# Patient Record
Sex: MALE | Race: BLACK OR AFRICAN AMERICAN | URBAN - METROPOLITAN AREA
[De-identification: names, ages, dates, MRNs, and addresses within clinical notes are randomized per-mention and may not be internally consistent; named-entity substitution may affect disease eponyms.]

---

## 2017-12-19 RX ORDER — DOXYCYCLINE 100 MG/1
100 TABLET ORAL 2 TIMES DAILY
Qty: 20 TAB | Refills: 0 | Status: SHIPPED | OUTPATIENT
Start: 2017-12-19 | End: 2018-11-21 | Stop reason: ALTCHOICE

## 2018-11-21 ENCOUNTER — OFFICE VISIT (OUTPATIENT)
Dept: INTERNAL MEDICINE CLINIC | Age: 23
End: 2018-11-21

## 2018-11-21 VITALS
HEART RATE: 56 BPM | TEMPERATURE: 98.6 F | RESPIRATION RATE: 16 BRPM | SYSTOLIC BLOOD PRESSURE: 111 MMHG | HEIGHT: 66 IN | OXYGEN SATURATION: 97 % | DIASTOLIC BLOOD PRESSURE: 58 MMHG | WEIGHT: 178.1 LBS | BODY MASS INDEX: 28.62 KG/M2

## 2018-11-21 DIAGNOSIS — N50.819 TESTICLE PAIN: ICD-10-CM

## 2018-11-21 DIAGNOSIS — M54.50 LOW BACK PAIN WITHOUT SCIATICA, UNSPECIFIED BACK PAIN LATERALITY, UNSPECIFIED CHRONICITY: ICD-10-CM

## 2018-11-21 DIAGNOSIS — R51.9 NONINTRACTABLE HEADACHE, UNSPECIFIED CHRONICITY PATTERN, UNSPECIFIED HEADACHE TYPE: Primary | ICD-10-CM

## 2018-11-21 PROBLEM — M54.9 BACK PAIN: Status: ACTIVE | Noted: 2018-11-21

## 2018-11-21 NOTE — PROGRESS NOTES
SPORTS MEDICINE AND PRIMARY CARE Pilar Huitron MD, [address] [address] Phone:  [phone]  Fax: [phone] Chief Complaint Patient presents with [address] [place] [address] SUBECTIVE: 
 
Fortino Leal is a 26 y.o. male Patient returns today with several concerns. He has a headache feeling, described as a pressure sensation in the bitemporal area for the past month or so. It lasts throughout the day, on a scale of 0-10 it is a level 3. It does not wake him up, but as the day goes on during the day he has discomfort, at some times associated with the blurred vision. He also complains of testicular discomfort, feels he has lumps on his testicles since the summer. He complains of right parasternal chest discomfort described as a soreness. Patient complains of upper and lower back discomfort, aggravated by change in position. Patient is seen for evaluation. Past Medical History:  
Diagnosis Date  Back pain 11/21/2018  Headache 11/21/2018  Testicle pain 11/21/2018 History reviewed. No pertinent surgical history. No Known Allergies REVIEW OF SYSTEMS: 
 No chills, no fever. Social History Socioeconomic History  Marital status: SINGLE Spouse name: Not on file  Number of children: Not on file  Years of education: Not on file  Highest education level: Not on file Tobacco Use  Smoking status: Never Smoker  Smokeless tobacco: Never Used Substance and Sexual Activity  Alcohol use: Yes Comment: occasional  
 Drug use: Yes Types: Marijuana  Sexual activity: Yes  
  Partners: Female Birth control/protection: None  
r Family History Problem Relation Age of Onset  Diabetes Mother   
 
Habits: The patient does not use drugs, uses marijuana occasionally recreationally. He has drank in the past, but it hurts his stomach so he stopped. No history of cigarette abuse. Social History:  The patient is single, lives with his father and stepmother. 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 He has no children. Family History:  Father is 48, alive and well. Mother is 61 with diabetes. One brother, one sister are alive and well. OBJECTIVE: 
Visit Vitals /58 Pulse (!) 56 Temp 98.6 °F (37 °C) (Oral) Resp 16 Ht 5' 6\" (1.676 m) Wt 178 lb 1.6 oz (80.8 kg) SpO2 97% BMI 28.75 kg/m² ENT: perrla,  eom intact NECK: supple. Thyroid normal 
CHEST: clear to ascultation and percussion HEART: regular rate and rhythm ABD: soft, bowel sounds active EXTREMITIES: no edema, pulse 1+ No visits with results within 3 Month(s) from this visit. Latest known visit with results is:  
Office Visit on 06/16/2015 Component Date Value Ref Range Status  Mumps Abs, IgG 06/16/2015 76.9  Immune >10.9 AU/mL Final  
 Comment:                                 Negative         <9.0 Equivocal  9.0 - 10.9 Positive        >10.9 A positive result generally indicates past exposure to Mumps virus or previous vaccination.  Rubella Ab, IgG 06/16/2015 <0.90* Immune >0.99 index Final  
 Comment:                                 Non-immune       <0.90 Equivocal  0.90 - 0.99 Immune           >0.99 
  
 Rubeola Ab, IgG 06/16/2015 <25.0* Immune >29.9 AU/mL Final  
 Comment:                                  Negative        <25.0 Equivocal 25.0 - 29.9 Positive        >29.9 Presence of antibodies to Rubeola is presumptive evidence 
of immunity except when acute infection is suspected.  
  
 VARICELLA ZOSTER IGG 06/16/2015 <135* Immune >165 index Final  
 Comment:                                Negative          <135 Equivocal    135 - 165 Positive          >165 A positive result generally indicates exposure to the 
pathogen or administration of specific immunoglobulins, 
but it is not indication of active infection or stage 
of disease.  HEP B SURFACE AB, QUAL 06/16/2015 Non Reactive   Final  
 Comment:               Non Reactive: Inconsistent with immunity, 
                            less than 10 mIU/mL Reactive:     Consistent with immunity, 
                            greater than 9.9 mIU/mL  WBC 06/16/2015 5.5  3.4 - 10.8 x10E3/uL Final  
 RBC 06/16/2015 5.34* 3.77 - 5.28 x10E6/uL Final  
 HGB 06/16/2015 14.4  11.1 - 15.9 g/dL Final  
 HCT 06/16/2015 44.8  34.0 - 46.6 % Final  
 MCV 06/16/2015 84  79 - 97 fL Final  
 MCH 06/16/2015 27.0  26.6 - 33.0 pg Final  
 MCHC 06/16/2015 32.1  31.5 - 35.7 g/dL Final  
 RDW 06/16/2015 15.2  12.3 - 15.4 % Final  
 PLATELET 74/25/4032 284  150 - 379 x10E3/uL Final  
 NEUTROPHILS 06/16/2015 51  % Final  
 Lymphocytes 06/16/2015 34  % Final  
 MONOCYTES 06/16/2015 9  % Final  
 EOSINOPHILS 06/16/2015 5  % Final  
 BASOPHILS 06/16/2015 1  % Final  
 ABS. NEUTROPHILS 06/16/2015 2.8  1.4 - 7.0 x10E3/uL Final  
 Abs Lymphocytes 06/16/2015 1.8  0.7 - 3.1 x10E3/uL Final  
 ABS. MONOCYTES 06/16/2015 0.5  0.1 - 0.9 x10E3/uL Final  
 ABS. EOSINOPHILS 06/16/2015 0.3  0.0 - 0.4 x10E3/uL Final  
 ABS. BASOPHILS 06/16/2015 0.1  0.0 - 0.2 x10E3/uL Final  
 IMMATURE GRANULOCYTES 06/16/2015 0  % Final  
 ABS. IMM.  GRANS. 06/16/2015 0.0  0.0 - 0.1 x10E3/uL Final  
 Glucose 06/16/2015 84  65 - 99 mg/dL Final  
 BUN 06/16/2015 9  6 - 20 mg/dL Final  
 Creatinine 06/16/2015 1.17* 0.57 - 1.00 mg/dL Final  
 GFR est non-AA 06/16/2015 68  >59 mL/min/1.73 Final  
 GFR est AA 06/16/2015 79  >59 mL/min/1.73 Final  
  BUN/Creatinine ratio 06/16/2015 8  8 - 20 Final  
 Sodium 06/16/2015 140  134 - 144 mmol/L Final  
 Potassium 06/16/2015 4.8  3.5 - 5.2 mmol/L Final  
 Chloride 06/16/2015 101  97 - 108 mmol/L Final  
 CO2 06/16/2015 24  18 - 29 mmol/L Final  
 Calcium 06/16/2015 10.0  8.7 - 10.2 mg/dL Final  
 Protein, total 06/16/2015 7.0  6.0 - 8.5 g/dL Final  
 Albumin 06/16/2015 4.6  3.5 - 5.5 g/dL Final  
 GLOBULIN, TOTAL 06/16/2015 2.4  1.5 - 4.5 g/dL Final  
 A-G Ratio 06/16/2015 1.9  1.1 - 2.5 Final  
 Bilirubin, total 06/16/2015 1.6* 0.0 - 1.2 mg/dL Final  
 Alk. phosphatase 06/16/2015 81  43 - 101 IU/L Final  
 AST (SGOT) 06/16/2015 18  0 - 40 IU/L Final  
 ALT (SGPT) 06/16/2015 14  0 - 32 IU/L Final  
 
  
 
ASSESSMENT: 
1. Nonintractable headache, unspecified chronicity pattern, unspecified headache type 2. Low back pain without sciatica, unspecified back pain laterality, unspecified chronicity 3. Testicle pain In general patient's medical status is stable, I think he is very healthy at his age. He has bitemporal headaches, which I suspect are muscle contraction in origin, however we will ask for a CT scan of his head to exclude other pathology. Examination of the testicles are completely unremarkable. No tenderness. No nodules or masses are palpated. Because of his concerns will ask for ultrasound of his testicles. Appropriate blood studies will be requested and sent to her in the mail. He will be back to see us on a prn basis. I have discussed the diagnosis with the patient and the intended plan as seen in the 
orders above. The patient understands and agees with the plan. The patient has  
received an after visit summary and questions were answered concerning 
future plans Patient labs and/or xrays were reviewed Past records were reviewed. PLAN: 
. Orders Placed This Encounter  US SCROTUM/TESTICLES  
 CT HEAD WO CONT  
 URINALYSIS W/ RFLX MICROSCOPIC  
  CBC WITH AUTOMATED DIFF  
 METABOLIC PANEL, COMPREHENSIVE Follow-up Disposition: 
Return if symptoms worsen or fail to improve. ATTENTION:  
This medical record was transcribed using an electronic medical records system. Although proofread, it may and can contain electronic and spelling errors. Other human spelling and other errors may be present. Corrections may be executed at a later time. Please feel free to contact us for any clarifications as needed.

## 2018-11-21 NOTE — PROGRESS NOTES
1. Have you been to the ER, urgent care clinic since your last visit? Hospitalized since your last visit? No 
 
2. Have you seen or consulted any other health care providers outside of the [address] [place] [address] since your last visit? Include any pap smears or colon screening. No  
 
Complaints of pressure in head,lumps in testicles, pain all over

## 2018-11-22 LAB
ALBUMIN SERPL-MCNC: 5 G/DL (ref 3.5–5.5)
ALBUMIN/GLOB SERPL: 2.1 {RATIO} (ref 1.2–2.2)
ALP SERPL-CCNC: 59 IU/L (ref 39–117)
ALT SERPL-CCNC: 72 IU/L (ref 0–44)
APPEARANCE UR: CLEAR
AST SERPL-CCNC: 84 IU/L (ref 0–40)
BASOPHILS # BLD AUTO: 0 X10E3/UL (ref 0–0.2)
BASOPHILS NFR BLD AUTO: 1 %
BILIRUB SERPL-MCNC: 1.9 MG/DL (ref 0–1.2)
BILIRUB UR QL STRIP: NEGATIVE
BUN SERPL-MCNC: 10 MG/DL (ref 6–20)
BUN/CREAT SERPL: 10 (ref 9–20)
CALCIUM SERPL-MCNC: 9.9 MG/DL (ref 8.7–10.2)
CHLORIDE SERPL-SCNC: 104 MMOL/L (ref 96–106)
CO2 SERPL-SCNC: 21 MMOL/L (ref 20–29)
COLOR UR: YELLOW
CREAT SERPL-MCNC: 1.05 MG/DL (ref 0.76–1.27)
EOSINOPHIL # BLD AUTO: 0.1 X10E3/UL (ref 0–0.4)
EOSINOPHIL NFR BLD AUTO: 2 %
ERYTHROCYTE [DISTWIDTH] IN BLOOD BY AUTOMATED COUNT: 15 % (ref 12.3–15.4)
GLOBULIN SER CALC-MCNC: 2.4 G/DL (ref 1.5–4.5)
GLUCOSE SERPL-MCNC: 81 MG/DL (ref 65–99)
GLUCOSE UR QL: NEGATIVE
HCT VFR BLD AUTO: 41.5 % (ref 37.5–51)
HGB BLD-MCNC: 13.9 G/DL (ref 13–17.7)
HGB UR QL STRIP: NEGATIVE
IMM GRANULOCYTES # BLD: 0 X10E3/UL (ref 0–0.1)
IMM GRANULOCYTES NFR BLD: 0 %
KETONES UR QL STRIP: ABNORMAL
LEUKOCYTE ESTERASE UR QL STRIP: NEGATIVE
LYMPHOCYTES # BLD AUTO: 1.6 X10E3/UL (ref 0.7–3.1)
LYMPHOCYTES NFR BLD AUTO: 43 %
MCH RBC QN AUTO: 28.3 PG (ref 26.6–33)
MCHC RBC AUTO-ENTMCNC: 33.5 G/DL (ref 31.5–35.7)
MCV RBC AUTO: 84 FL (ref 79–97)
MICRO URNS: ABNORMAL
MONOCYTES # BLD AUTO: 0.5 X10E3/UL (ref 0.1–0.9)
MONOCYTES NFR BLD AUTO: 12 %
NEUTROPHILS # BLD AUTO: 1.5 X10E3/UL (ref 1.4–7)
NEUTROPHILS NFR BLD AUTO: 42 %
NITRITE UR QL STRIP: NEGATIVE
PH UR STRIP: 5.5 [PH] (ref 5–7.5)
PLATELET # BLD AUTO: 287 X10E3/UL (ref 150–379)
POTASSIUM SERPL-SCNC: 4.3 MMOL/L (ref 3.5–5.2)
PROT SERPL-MCNC: 7.4 G/DL (ref 6–8.5)
PROT UR QL STRIP: NEGATIVE
RBC # BLD AUTO: 4.92 X10E6/UL (ref 4.14–5.8)
SODIUM SERPL-SCNC: 140 MMOL/L (ref 134–144)
SP GR UR: >=1.03 (ref 1–1.03)
UROBILINOGEN UR STRIP-MCNC: 1 MG/DL (ref 0.2–1)
WBC # BLD AUTO: 3.7 X10E3/UL (ref 3.4–10.8)

## 2018-12-11 DIAGNOSIS — G44.229 CHRONIC TENSION-TYPE HEADACHE, NOT INTRACTABLE: Primary | ICD-10-CM

## 2018-12-11 RX ORDER — BUTALBITAL, ASPIRIN AND CAFFEINE 50; 325; 40 MG/1; MG/1; MG/1
1 TABLET ORAL
Qty: 60 TAB | Refills: 3 | Status: SHIPPED | OUTPATIENT
Start: 2018-12-11 | End: 2020-12-02

## 2018-12-15 DIAGNOSIS — G44.229 CHRONIC TENSION-TYPE HEADACHE, NOT INTRACTABLE: ICD-10-CM

## 2019-01-02 DIAGNOSIS — R51.9 NONINTRACTABLE HEADACHE, UNSPECIFIED CHRONICITY PATTERN, UNSPECIFIED HEADACHE TYPE: Primary | ICD-10-CM

## 2020-12-02 ENCOUNTER — OFFICE VISIT (OUTPATIENT)
Dept: INTERNAL MEDICINE CLINIC | Age: 25
End: 2020-12-02

## 2020-12-02 VITALS
BODY MASS INDEX: 29.39 KG/M2 | WEIGHT: 182.9 LBS | SYSTOLIC BLOOD PRESSURE: 137 MMHG | HEART RATE: 57 BPM | DIASTOLIC BLOOD PRESSURE: 57 MMHG | OXYGEN SATURATION: 98 % | HEIGHT: 66 IN | TEMPERATURE: 98.2 F | RESPIRATION RATE: 16 BRPM

## 2020-12-02 DIAGNOSIS — R32 URINARY INCONTINENCE, UNSPECIFIED TYPE: Primary | ICD-10-CM

## 2020-12-02 DIAGNOSIS — F32.A DEPRESSION, UNSPECIFIED DEPRESSION TYPE: ICD-10-CM

## 2020-12-02 PROCEDURE — 99212 OFFICE O/P EST SF 10 MIN: CPT | Performed by: INTERNAL MEDICINE

## 2020-12-02 NOTE — PROGRESS NOTES
SPORTS MEDICINE AND PRIMARY CARE  Bety Rivera MD, [address] [address]  Phone:  [phone]  Fax: [phone]           Chief Complaint   Patient presents with    Annual Exam   .     SUBJECTIVE:    Andi Palacios is a 26 y.o. male Patient returns today and feels depressed and has thought about suicide, but because of his boubacar he states he would never commit suicide. He is working at [employer] now. He dropped out of school, was at [employer], his last semester and was given all Fs. He tried to get back into school, but they would not accept him because his GPA was so low. He lives alone. He has no girlfriend currently. Patient is seen for evaluation. He also complains of urinary incontinence in the fact that he dribbles whenever he has flatus. This has been going on for the past year. Current Outpatient Medications   Medication Sig Dispense Refill    butalbital-aspirin-caffeine (FIORINAL) -40 mg tablet Take 1 Tab by mouth every four (4) hours as needed for Pain. Max Daily Amount: 6 Tabs. 60 Tab 3     Past Medical History:   Diagnosis Date    Back pain 11/21/2018    Headache 11/21/2018    Testicle pain 11/21/2018     History reviewed. No pertinent surgical history. No Known Allergies      ROS:   No dysuria. No chills or fever. OBJECTIVE:  Visit Vitals  BP (!) 137/57   Pulse (!) 57   Temp 98.2 °F (36.8 °C) (Oral)   Resp 16   Ht 5' 6\" (1.676 m)   Wt 182 lb 14.4 oz (83 kg)   SpO2 98%   BMI 29.52 kg/m²     ENT: perrla,  eom intact  NECK: supple. Thyroid normal  CHEST: clear bilaterally  HEART: regular rate and rhythm    No visits with results within 3 Month(s) from this visit.    Latest known visit with results is:   Office Visit on 11/21/2018   Component Date Value Ref Range Status    Specific Gravity 11/21/2018      >=1.030* 1.005 - 1.030 Final    pH (UA) 11/21/2018 5.5  5.0 - 7.5 Final    Color 11/21/2018 Yellow  Yellow Final    Appearance 11/21/2018 Clear  Clear Final    Leukocyte Esterase 11/21/2018 Negative  Negative Final    Protein 11/21/2018 Negative  Negative/Trace Final    Glucose 11/21/2018 Negative  Negative Final    Ketone 11/21/2018 Trace* Negative Final    Blood 11/21/2018 Negative  Negative Final    Bilirubin 11/21/2018 Negative  Negative Final    Urobilinogen 11/21/2018 1.0  0.2 - 1.0 mg/dL Final    Nitrites 11/21/2018 Negative  Negative Final    Microscopic Examination 11/21/2018 Comment   Final    Microscopic not indicated and not performed.  WBC 11/21/2018 3.7  3.4 - 10.8 x10E3/uL Final    RBC 11/21/2018 4.92  4.14 - 5.80 x10E6/uL Final    HGB 11/21/2018 13.9  13.0 - 17.7 g/dL Final    HCT 11/21/2018 41.5  37.5 - 51.0 % Final    MCV 11/21/2018 84  79 - 97 fL Final    MCH 11/21/2018 28.3  26.6 - 33.0 pg Final    MCHC 11/21/2018 33.5  31.5 - 35.7 g/dL Final    RDW 11/21/2018 15.0  12.3 - 15.4 % Final    PLATELET 95/00/3711 004  150 - 379 x10E3/uL Final    NEUTROPHILS 11/21/2018 42  Not Estab. % Final    Lymphocytes 11/21/2018 43  Not Estab. % Final    MONOCYTES 11/21/2018 12  Not Estab. % Final    EOSINOPHILS 11/21/2018 2  Not Estab. % Final    BASOPHILS 11/21/2018 1  Not Estab. % Final    ABS. NEUTROPHILS 11/21/2018 1.5  1.4 - 7.0 x10E3/uL Final    Abs Lymphocytes 11/21/2018 1.6  0.7 - 3.1 x10E3/uL Final    ABS. MONOCYTES 11/21/2018 0.5  0.1 - 0.9 x10E3/uL Final    ABS. EOSINOPHILS 11/21/2018 0.1  0.0 - 0.4 x10E3/uL Final    ABS. BASOPHILS 11/21/2018 0.0  0.0 - 0.2 x10E3/uL Final    IMMATURE GRANULOCYTES 11/21/2018 0  Not Estab. % Final    ABS. IMM.  GRANS. 11/21/2018 0.0  0.0 - 0.1 x10E3/uL Final    Glucose 11/21/2018 81  65 - 99 mg/dL Final    BUN 11/21/2018 10  6 - 20 mg/dL Final    Creatinine 11/21/2018 1.05  0.76 - 1.27 mg/dL Final    GFR est non-AA 11/21/2018 100  >59 mL/min/1.73 Final    GFR est AA 11/21/2018 116  >59 mL/min/1.73 Final    BUN/Creatinine ratio 11/21/2018 10  9 - 20 Final    Sodium 11/21/2018 140  134 - 144 mmol/L Final    Potassium 11/21/2018 4.3  3.5 - 5.2 mmol/L Final    Chloride 11/21/2018 104  96 - 106 mmol/L Final    CO2 11/21/2018 21  20 - 29 mmol/L Final    Calcium 11/21/2018 9.9  8.7 - 10.2 mg/dL Final    Protein, total 11/21/2018 7.4  6.0 - 8.5 g/dL Final    Albumin 11/21/2018 5.0  3.5 - 5.5 g/dL Final    GLOBULIN, TOTAL 11/21/2018 2.4  1.5 - 4.5 g/dL Final    A-G Ratio 11/21/2018 2.1  1.2 - 2.2 Final    Bilirubin, total 11/21/2018 1.9* 0.0 - 1.2 mg/dL Final    Alk. phosphatase 11/21/2018 59  39 - 117 IU/L Final    AST (SGOT) 11/21/2018 84* 0 - 40 IU/L Final    ALT (SGPT) 11/21/2018 72* 0 - 44 IU/L Final          Assessment:  1. Urinary incontinence, unspecified type    2. Depression, unspecified depression type      We spent some time talking to him about his goals and pathways to get the goals accomplished. Jessica Ruiz now has a black president and we suggest he talk to him about reinstating him in school. I think he is depressed about this, as well as other issues, and he agrees to see a psychiatrist.    The urinary incontinence will begin with Kegel exercises. If that does not resolve the episodes, then we will have him see a urologist.      I have discussed the diagnosis with the patient and the intended plan as seen in the  orders above. The patient understands and agees with the plan. The patient has   received an after visit summary and questions were answered concerning  future plans  Patient labs and/or xrays were reviewed  Past records were reviewed. Plan:  . Orders Placed This Encounter    REFERRAL TO PSYCHIATRY         Follow-up and Dispositions    · Return in about 4 months (around 4/2/2021). ATTENTION:   This medical record was transcribed using an electronic medical records system. Although proofread, it may and can contain electronic and spelling errors. Other human spelling and other errors may be present.   Corrections may be executed at a later time. Please feel free to contact us for any clarifications as needed.

## 2020-12-02 NOTE — PROGRESS NOTES
1. Have you been to the ER, urgent care clinic since your last visit? Hospitalized since your last visit? No    2. Have you seen or consulted any other health care providers outside of the [address] [place] [address] since your last visit? Include any pap smears or colon screening.  No    Wants to discuss depression  Wants to discuss urine issue